# Patient Record
Sex: MALE | Race: WHITE | HISPANIC OR LATINO | Employment: FULL TIME | URBAN - METROPOLITAN AREA
[De-identification: names, ages, dates, MRNs, and addresses within clinical notes are randomized per-mention and may not be internally consistent; named-entity substitution may affect disease eponyms.]

---

## 2021-03-26 DIAGNOSIS — Z52.3 BONE MARROW DONOR: Primary | ICD-10-CM

## 2021-04-07 LAB
A*: NORMAL
A*LOCUS SEROLOGIC EQUIVALENT: 2
A*LOCUS: NORMAL
A*SEROLOGIC EQUIVALENT: 24
AB TEST METHOD: NORMAL
B*: NORMAL
B*LOCUS SEROLOGIC EQUIVALENT: 62
B*LOCUS: NORMAL
B*SEROLOGIC EQUIVALENT: 35
BW-1: NORMAL
C*: NORMAL
C*LOCUS SEROLOGIC EQUIVALENT: 1
C*LOCUS: NORMAL
C*SEROLOGIC EQUIVALENT: 7
DPA1*: NORMAL
DPB1*: NORMAL
DPB1*LOCUS NMDP: NORMAL
DPB1*LOCUS: NORMAL
DPB1*NMDP: NORMAL
DQA1*: NORMAL
DQA1*LOCUS: NORMAL
DQB1*: NORMAL
DQB1*LOCUS SEROLOGIC EQUIVALENT: 8
DQB1*LOCUS: NORMAL
DQB1*SEROLOGIC EQUIVALENT: 4
DRB1*: NORMAL
DRB1*LOCUS SEROLOGIC EQUIVALENT: 8
DRB1*LOCUS: NORMAL
DRB1*SEROLOGIC EQUIVALENT: 4
DRB4*LOCUS SEROLOGIC EQUIVALENT: 53
DRB4*LOCUS: NORMAL
DRNGS COMMENTS: NORMAL
DRSSO TEST METHOD: NORMAL

## 2021-08-07 ENCOUNTER — HOSPITAL ENCOUNTER (EMERGENCY)
Facility: CLINIC | Age: 26
Discharge: HOME | End: 2021-08-07
Attending: EMERGENCY MEDICINE | Admitting: EMERGENCY MEDICINE

## 2021-08-07 ENCOUNTER — APPOINTMENT (OUTPATIENT)
Dept: GENERAL RADIOLOGY | Facility: CLINIC | Age: 26
End: 2021-08-07
Attending: EMERGENCY MEDICINE

## 2021-08-07 VITALS
RESPIRATION RATE: 18 BRPM | OXYGEN SATURATION: 100 % | SYSTOLIC BLOOD PRESSURE: 141 MMHG | TEMPERATURE: 97.8 F | DIASTOLIC BLOOD PRESSURE: 75 MMHG | HEART RATE: 51 BPM

## 2021-08-07 DIAGNOSIS — S62.641A CLOSED NONDISPLACED FRACTURE OF PROXIMAL PHALANX OF LEFT INDEX FINGER, INITIAL ENCOUNTER: ICD-10-CM

## 2021-08-07 PROCEDURE — 73130 X-RAY EXAM OF HAND: CPT | Mod: LT

## 2021-08-07 PROCEDURE — 99284 EMERGENCY DEPT VISIT MOD MDM: CPT | Mod: 25

## 2021-08-07 PROCEDURE — 29130 APPL FINGER SPLINT STATIC: CPT | Mod: F1

## 2021-08-07 NOTE — ED PROVIDER NOTES
Visit Date: 08/07/2021    CHIEF COMPLAINT:  Left hand pain.    HISTORY OF PRESENT ILLNESS:  This is a 26-year-old male who was at work.  He was moving a box that was labeled 70+ pounds when it slipped and fell onto his left hand.  He has pain at the base of his second digit.  He is right handed.  He sustained no other trauma.  No other complaints at this time.    PAST MEDICAL HISTORY:  None.    PAST SURGICAL HISTORY:  None.    MEDICATIONS:  None.    ALLERGIES:  NONE.    SOCIAL HISTORY:  The patient presents to the Emergency Department by himself.    REVIEW OF SYSTEMS:  A pertinent 10-point review of systems is negative except for that noted in the HPI.    PHYSICAL EXAMINATION:    GENERAL:  The patient is sitting up comfortably and appropriate with interaction.  VITAL SIGNS:  Blood pressure 141/75, heart rate 51, respiratory rate 18, oxygen 100% on room air, temperature 97.8 degrees.  CARDIOVASCULAR:  Regular rhythm, bradycardic rate, 2+ radial pulse on the left.  Normal perfusion in the distal second digit.  RESPIRATORY:  Normal effort.  MUSCULOSKELETAL:  Full range of motion of the left wrist and hand.  Full range of motion of the thumb and third through fifth fingers.  There is diffuse soft tissue swelling and ecchymosis to the base of the second finger of the left hand.  No rotational deformity.  SKIN:  No lacerations.  Bruising as noted above.    NEUROLOGIC:  The patient is alert, oriented x4.  Normal sensation in the affected finger.  PSYCHIATRIC:  The patient has normal mood and affect.    LABORATORY EVALUATION AND DIAGNOSTIC STUDIES:  Plain x-ray of the left hand shows a nondisplaced fracture at the base of the proximal phalanx of the second digit.    EMERGENCY DEPARTMENT COURSE AND MEDICAL DECISION MAKING:  This is a 26-year-old gentleman who presents with blunt trauma to his left hand.  He has a nondisplaced fracture to the proximal phalanx of the second finger.  This was placed in an extensor splint with  tatyana taping to the third digit.  He will follow up with [facility] on Monday for consultation.  I do anticipate this being managed nonsurgically.    DIAGNOSIS:   Acute fracture to the proximal phalanx of the second finger of the left hand.    Severo Spencer MD        D: 2021   T: 2021   MT: [id number]    Name:     CHANTELL ORR  MRN:      [id number]        Account:    [id number]   :      1995           Visit Date: 2021     Document: [id number]

## 2022-05-08 ENCOUNTER — HOSPITAL ENCOUNTER (EMERGENCY)
Facility: HOSPITAL | Age: 27
Discharge: ANOTHER HOSPITAL | End: 2022-05-09
Attending: EMERGENCY MEDICINE | Admitting: EMERGENCY MEDICINE
Payer: COMMERCIAL

## 2022-05-08 ENCOUNTER — APPOINTMENT (OUTPATIENT)
Dept: CT IMAGING | Facility: HOSPITAL | Age: 27
End: 2022-05-08
Attending: EMERGENCY MEDICINE
Payer: COMMERCIAL

## 2022-05-08 ENCOUNTER — APPOINTMENT (OUTPATIENT)
Dept: RADIOLOGY | Facility: HOSPITAL | Age: 27
End: 2022-05-08
Attending: EMERGENCY MEDICINE
Payer: COMMERCIAL

## 2022-05-08 DIAGNOSIS — F10.929 ALCOHOLIC INTOXICATION WITH COMPLICATION (H): ICD-10-CM

## 2022-05-08 DIAGNOSIS — S02.600B OPEN FRACTURE OF BODY OF MANDIBLE, UNSPECIFIED LATERALITY, INITIAL ENCOUNTER (H): ICD-10-CM

## 2022-05-08 DIAGNOSIS — S20.211A CHEST WALL CONTUSION, RIGHT, INITIAL ENCOUNTER: ICD-10-CM
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
## 2022-05-08 LAB
ALBUMIN SERPL-MCNC: 4.4 G/DL (ref 3.5–5)
ALP SERPL-CCNC: 71 U/L (ref 45–120)
ALT SERPL W P-5'-P-CCNC: 198 U/L (ref 0–45)
ANION GAP SERPL CALCULATED.3IONS-SCNC: 14 MMOL/L (ref 5–18)
AST SERPL W P-5'-P-CCNC: 250 U/L (ref 0–40)
BILIRUB SERPL-MCNC: 0.2 MG/DL (ref 0–1)
BUN SERPL-MCNC: 9 MG/DL (ref 8–22)
CALCIUM SERPL-MCNC: 8.8 MG/DL (ref 8.5–10.5)
CHLORIDE BLD-SCNC: 110 MMOL/L (ref 98–107)
CO2 SERPL-SCNC: 23 MMOL/L (ref 22–31)
CREAT SERPL-MCNC: 0.91 MG/DL (ref 0.7–1.3)
ETHANOL SERPL-MCNC: 159 MG/DL
GFR SERPL CREATININE-BSD FRML MDRD: >90 ML/MIN/1.73M2
GLUCOSE BLD-MCNC: 118 MG/DL (ref 70–125)
HOLD SPECIMEN: NORMAL
POTASSIUM BLD-SCNC: 3.6 MMOL/L (ref 3.5–5)
PROT SERPL-MCNC: 7.3 G/DL (ref 6–8)
SODIUM SERPL-SCNC: 147 MMOL/L (ref 136–145)

## 2022-05-08 PROCEDURE — 250N000011 HC RX IP 250 OP 636: Performed by: EMERGENCY MEDICINE

## 2022-05-08 PROCEDURE — 93005 ELECTROCARDIOGRAM TRACING: CPT | Performed by: EMERGENCY MEDICINE

## 2022-05-08 PROCEDURE — 85027 COMPLETE CBC AUTOMATED: CPT | Performed by: EMERGENCY MEDICINE

## 2022-05-08 PROCEDURE — 258N000003 HC RX IP 258 OP 636: Performed by: EMERGENCY MEDICINE

## 2022-05-08 PROCEDURE — 82077 ASSAY SPEC XCP UR&BREATH IA: CPT | Performed by: EMERGENCY MEDICINE

## 2022-05-08 PROCEDURE — 96374 THER/PROPH/DIAG INJ IV PUSH: CPT | Mod: 59

## 2022-05-08 PROCEDURE — 70486 CT MAXILLOFACIAL W/O DYE: CPT

## 2022-05-08 PROCEDURE — 71275 CT ANGIOGRAPHY CHEST: CPT

## 2022-05-08 PROCEDURE — 36415 COLL VENOUS BLD VENIPUNCTURE: CPT | Performed by: STUDENT IN AN ORGANIZED HEALTH CARE EDUCATION/TRAINING PROGRAM

## 2022-05-08 PROCEDURE — 71045 X-RAY EXAM CHEST 1 VIEW: CPT

## 2022-05-08 PROCEDURE — 96375 TX/PRO/DX INJ NEW DRUG ADDON: CPT

## 2022-05-08 PROCEDURE — 684N000002 HC FULL TRAUMA W/O CC LEVEL IV

## 2022-05-08 PROCEDURE — 74174 CTA ABD&PLVS W/CONTRAST: CPT

## 2022-05-08 PROCEDURE — 72125 CT NECK SPINE W/O DYE: CPT

## 2022-05-08 PROCEDURE — 80053 COMPREHEN METABOLIC PANEL: CPT | Performed by: EMERGENCY MEDICINE

## 2022-05-08 PROCEDURE — 85007 BL SMEAR W/DIFF WBC COUNT: CPT | Performed by: EMERGENCY MEDICINE

## 2022-05-08 PROCEDURE — 99285 EMERGENCY DEPT VISIT HI MDM: CPT | Mod: 25

## 2022-05-08 RX ORDER — IOPAMIDOL 755 MG/ML
100 INJECTION, SOLUTION INTRAVASCULAR ONCE
Status: COMPLETED | OUTPATIENT
Start: 2022-05-08 | End: 2022-05-08

## 2022-05-08 RX ORDER — FENTANYL CITRATE 50 UG/ML
50 INJECTION, SOLUTION INTRAMUSCULAR; INTRAVENOUS ONCE
Status: DISCONTINUED | OUTPATIENT
Start: 2022-05-08 | End: 2022-05-09 | Stop reason: HOSPADM

## 2022-05-08 RX ORDER — ONDANSETRON 2 MG/ML
4 INJECTION INTRAMUSCULAR; INTRAVENOUS ONCE
Status: COMPLETED | OUTPATIENT
Start: 2022-05-08 | End: 2022-05-08

## 2022-05-08 RX ORDER — KETOROLAC TROMETHAMINE 30 MG/ML
15 INJECTION, SOLUTION INTRAMUSCULAR; INTRAVENOUS ONCE
Status: COMPLETED | OUTPATIENT
Start: 2022-05-08 | End: 2022-05-08

## 2022-05-08 RX ORDER — SODIUM CHLORIDE 9 MG/ML
INJECTION, SOLUTION INTRAVENOUS ONCE
Status: COMPLETED | OUTPATIENT
Start: 2022-05-08 | End: 2022-05-08

## 2022-05-08 RX ADMIN — SODIUM CHLORIDE: 9 INJECTION, SOLUTION INTRAVENOUS at 22:26

## 2022-05-08 RX ADMIN — KETOROLAC TROMETHAMINE 15 MG: 30 INJECTION, SOLUTION INTRAMUSCULAR at 22:52

## 2022-05-08 RX ADMIN — IOPAMIDOL 100 ML: 755 INJECTION, SOLUTION INTRAVENOUS at 23:15

## 2022-05-08 RX ADMIN — ONDANSETRON 4 MG: 2 INJECTION INTRAMUSCULAR; INTRAVENOUS at 22:25

## 2022-05-08 ASSESSMENT — ENCOUNTER SYMPTOMS: ABDOMINAL PAIN: 0

## 2022-05-09 VITALS
HEART RATE: 73 BPM | SYSTOLIC BLOOD PRESSURE: 121 MMHG | DIASTOLIC BLOOD PRESSURE: 61 MMHG | RESPIRATION RATE: 14 BRPM | OXYGEN SATURATION: 98 % | TEMPERATURE: 98 F

## 2022-05-09 LAB
BASOPHILS # BLD MANUAL: 0.2 10E3/UL (ref 0–0.2)
BASOPHILS NFR BLD MANUAL: 2 %
ELLIPTOCYTES BLD QL SMEAR: SLIGHT
EOSINOPHIL # BLD MANUAL: 0.3 10E3/UL (ref 0–0.7)
EOSINOPHIL NFR BLD MANUAL: 3 %
ERYTHROCYTE [DISTWIDTH] IN BLOOD BY AUTOMATED COUNT: 14.6 % (ref 10–15)
FRAGMENTS BLD QL SMEAR: SLIGHT
HCT VFR BLD AUTO: 41.6 % (ref 40–53)
HGB BLD-MCNC: 13.2 G/DL (ref 13.3–17.7)
LYMPHOCYTES # BLD MANUAL: 3.9 10E3/UL (ref 0.8–5.3)
LYMPHOCYTES NFR BLD MANUAL: 39 %
MCH RBC QN AUTO: 26.3 PG (ref 26.5–33)
MCHC RBC AUTO-ENTMCNC: 31.7 G/DL (ref 31.5–36.5)
MCV RBC AUTO: 83 FL (ref 78–100)
MONOCYTES # BLD MANUAL: 0.4 10E3/UL (ref 0–1.3)
MONOCYTES NFR BLD MANUAL: 4 %
NEUTROPHILS # BLD MANUAL: 5.2 10E3/UL (ref 1.6–8.3)
NEUTROPHILS NFR BLD MANUAL: 52 %
PLAT MORPH BLD: ABNORMAL
PLATELET # BLD AUTO: 275 10E3/UL (ref 150–450)
RBC # BLD AUTO: 5.02 10E6/UL (ref 4.4–5.9)
RBC MORPH BLD: ABNORMAL
WBC # BLD AUTO: 10 10E3/UL (ref 4–11)
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
## 2022-05-13 LAB
ATRIAL RATE - MUSE: 72 BPM
DIASTOLIC BLOOD PRESSURE - MUSE: NORMAL MMHG
INTERPRETATION ECG - MUSE: NORMAL
P AXIS - MUSE: 70 DEGREES
PR INTERVAL - MUSE: 168 MS
QRS DURATION - MUSE: 94 MS
QT - MUSE: 378 MS
QTC - MUSE: 413 MS
R AXIS - MUSE: 90 DEGREES
SYSTOLIC BLOOD PRESSURE - MUSE: NORMAL MMHG
T AXIS - MUSE: 48 DEGREES
VENTRICULAR RATE- MUSE: 72 BPM